# Patient Record
Sex: FEMALE | Race: BLACK OR AFRICAN AMERICAN | HISPANIC OR LATINO | URBAN - METROPOLITAN AREA
[De-identification: names, ages, dates, MRNs, and addresses within clinical notes are randomized per-mention and may not be internally consistent; named-entity substitution may affect disease eponyms.]

---

## 2022-02-17 ENCOUNTER — OFFICE VISIT (OUTPATIENT)
Dept: URBAN - METROPOLITAN AREA CLINIC 113 | Facility: CLINIC | Age: 27
End: 2022-02-17
Payer: COMMERCIAL

## 2022-02-17 VITALS
SYSTOLIC BLOOD PRESSURE: 120 MMHG | BODY MASS INDEX: 23.34 KG/M2 | WEIGHT: 154 LBS | DIASTOLIC BLOOD PRESSURE: 81 MMHG | HEART RATE: 91 BPM | HEIGHT: 68 IN | TEMPERATURE: 98.2 F

## 2022-02-17 DIAGNOSIS — K59.01 CONSTIPATION BY DELAYED COLONIC TRANSIT: ICD-10-CM

## 2022-02-17 PROCEDURE — 99204 OFFICE O/P NEW MOD 45 MIN: CPT | Performed by: NURSE PRACTITIONER

## 2022-02-17 RX ORDER — NICOTINE 14MG/24HR
1 CAPSULE PATCH, TRANSDERMAL 24 HOURS TRANSDERMAL TWICE A DAY
Status: ACTIVE | COMMUNITY

## 2022-02-17 RX ORDER — FLUTICASONE PROPIONATE 50 UG/1
1 SPRAY IN EACH NOSTRIL SPRAY, METERED NASAL ONCE A DAY
Status: ACTIVE | COMMUNITY

## 2022-02-17 RX ORDER — PSYLLIUM SEED (WITH DEXTROSE)
AS DIRECTED POWDER (GRAM) ORAL
Status: ACTIVE | COMMUNITY

## 2022-02-17 NOTE — HPI-TODAY'S VISIT:
27 yo female with a history of anxiety and depression, presenting for evaluation of constipation.  Abd XR 2/14/22: large fecal load.  She went to the ER for generalized abdominal pain, characterized as sharp and stabbing. She has been having constipation for the last few months. She has tried dietary modification, increasing water intake, and exercising. She drank the PEG bowel prep provided to her in the ED, which helped, but feels as if there is something "stuck in her bum." She has bowel movements daily, but they are small, hard and pellet like. Sometimes when she wipes, she can see mucus on the tissue. No blood per rectum. She does have a PCP, Dr. Chavez Chery at [facility] [address]. She tells me that she has mentioned her constipation to him in the past, and was recommended she try Metamucil. She tried a short course of MiraLAX for 10 days without any relief.

## 2022-03-07 ENCOUNTER — OFFICE VISIT (OUTPATIENT)
Dept: URBAN - METROPOLITAN AREA CLINIC 113 | Facility: CLINIC | Age: 27
End: 2022-03-07
Payer: COMMERCIAL

## 2022-03-07 ENCOUNTER — WEB ENCOUNTER (OUTPATIENT)
Dept: URBAN - METROPOLITAN AREA CLINIC 113 | Facility: CLINIC | Age: 27
End: 2022-03-07

## 2022-03-07 VITALS
TEMPERATURE: 98.2 F | HEART RATE: 82 BPM | HEIGHT: 68 IN | BODY MASS INDEX: 23.64 KG/M2 | DIASTOLIC BLOOD PRESSURE: 76 MMHG | WEIGHT: 156 LBS | SYSTOLIC BLOOD PRESSURE: 117 MMHG

## 2022-03-07 DIAGNOSIS — K59.01 CONSTIPATION BY DELAYED COLONIC TRANSIT: ICD-10-CM

## 2022-03-07 PROCEDURE — 99213 OFFICE O/P EST LOW 20 MIN: CPT | Performed by: INTERNAL MEDICINE

## 2022-03-07 RX ORDER — FLUTICASONE PROPIONATE 50 UG/1
1 SPRAY IN EACH NOSTRIL SPRAY, METERED NASAL ONCE A DAY
Status: ON HOLD | COMMUNITY

## 2022-03-07 RX ORDER — POLYETHYLENE GLYCOL 3350 17 G/17G
AS DIRECTED POWDER, FOR SOLUTION ORAL
Status: ACTIVE | COMMUNITY

## 2022-03-07 RX ORDER — PSYLLIUM SEED (WITH DEXTROSE)
AS DIRECTED POWDER (GRAM) ORAL
Status: ON HOLD | COMMUNITY

## 2022-03-07 RX ORDER — LEVONORGESTREL 52 MG/1
AS DIRECTED INTRAUTERINE DEVICE INTRAUTERINE
Status: ACTIVE | COMMUNITY

## 2022-03-07 RX ORDER — NICOTINE 14MG/24HR
1 CAPSULE PATCH, TRANSDERMAL 24 HOURS TRANSDERMAL TWICE A DAY
Status: ACTIVE | COMMUNITY

## 2022-03-07 RX ORDER — DOCUSATE SODIUM 100 MG/1
1 CAPSULE AS NEEDED CAPSULE, LIQUID FILLED ORAL ONCE A DAY
Status: ACTIVE | COMMUNITY

## 2022-03-07 NOTE — HPI-TODAY'S VISIT:
Pleasant 26-year-old was recently seen for constipation.  She has been using MiraLAX every day and senna at night.  She has been on a muscle relaxer recently.  She does have increased stress.  She is having a bowel movement every day but has solitario this been no blood or melena or weight loss or fever.  She rarely will have heartburn for which she uses Pepcid.  She did take a bottle magnesium citrate earlier in the week and had diarrhea.  She does strain when she has bowel movements.

## 2022-03-30 ENCOUNTER — LAB OUTSIDE AN ENCOUNTER (OUTPATIENT)
Dept: URBAN - METROPOLITAN AREA CLINIC 113 | Facility: CLINIC | Age: 27
End: 2022-03-30

## 2022-03-30 ENCOUNTER — OFFICE VISIT (OUTPATIENT)
Dept: URBAN - METROPOLITAN AREA CLINIC 113 | Facility: CLINIC | Age: 27
End: 2022-03-30
Payer: COMMERCIAL

## 2022-03-30 ENCOUNTER — WEB ENCOUNTER (OUTPATIENT)
Dept: URBAN - METROPOLITAN AREA CLINIC 113 | Facility: CLINIC | Age: 27
End: 2022-03-30

## 2022-03-30 VITALS
HEART RATE: 83 BPM | WEIGHT: 152.5 LBS | TEMPERATURE: 98.2 F | RESPIRATION RATE: 18 BRPM | SYSTOLIC BLOOD PRESSURE: 124 MMHG | HEIGHT: 68 IN | DIASTOLIC BLOOD PRESSURE: 84 MMHG | BODY MASS INDEX: 23.11 KG/M2

## 2022-03-30 DIAGNOSIS — K59.01 CONSTIPATION BY DELAYED COLONIC TRANSIT: ICD-10-CM

## 2022-03-30 PROCEDURE — 99214 OFFICE O/P EST MOD 30 MIN: CPT | Performed by: NURSE PRACTITIONER

## 2022-03-30 RX ORDER — LINACLOTIDE 145 UG/1
1 CAPSULE AT LEAST 30 MINUTES BEFORE THE FIRST MEAL OF THE DAY ON AN EMPTY STOMACH CAPSULE, GELATIN COATED ORAL ONCE A DAY
Qty: 30 | OUTPATIENT
Start: 2022-03-30 | End: 2022-04-29

## 2022-03-30 RX ORDER — FLUTICASONE PROPIONATE 50 UG/1
1 SPRAY IN EACH NOSTRIL SPRAY, METERED NASAL ONCE A DAY
Status: ON HOLD | COMMUNITY

## 2022-03-30 RX ORDER — LEVONORGESTREL 52 MG/1
AS DIRECTED INTRAUTERINE DEVICE INTRAUTERINE
Status: ON HOLD | COMMUNITY

## 2022-03-30 RX ORDER — DOCUSATE SODIUM 100 MG/1
1 CAPSULE AS NEEDED CAPSULE, LIQUID FILLED ORAL ONCE A DAY
Status: ON HOLD | COMMUNITY

## 2022-03-30 RX ORDER — NICOTINE 14MG/24HR
1 CAPSULE PATCH, TRANSDERMAL 24 HOURS TRANSDERMAL TWICE A DAY
Status: ACTIVE | COMMUNITY

## 2022-03-30 RX ORDER — POLYETHYLENE GLYCOL 3350 17 G/17G
AS DIRECTED POWDER, FOR SOLUTION ORAL
Status: ON HOLD | COMMUNITY

## 2022-03-30 RX ORDER — SODIUM, POTASSIUM,MAG SULFATES 17.5-3.13G
354 ML SOLUTION, RECONSTITUTED, ORAL ORAL ONCE
Qty: 354 MILLILITER | Refills: 0 | OUTPATIENT
Start: 2022-03-30 | End: 2022-03-31

## 2022-03-30 RX ORDER — PSYLLIUM SEED (WITH DEXTROSE)
AS DIRECTED POWDER (GRAM) ORAL
Status: ON HOLD | COMMUNITY

## 2022-03-30 NOTE — HPI-TODAY'S VISIT:
27yo woman who has been following with our office for the last 6 weeks regarding constipation predominant bowel habits. A KUB on 2/14/22 did show moderate stool burden. We have been able to achieve results with use of bowel purges (Magnesium citrate). MiraLAX alone was ineffective. She was started on Linzess 72 mcg daily on 3/7/22. If no improvement with Linzess, labs (CBC, CMP, TSH, free T4) were to be obtained, as well as consideration for flexible sigmoidoscopy versus colonoscopy.  She tried the Linzess samples, which worked for about a week, and then stopped working. She states Linzess took about 4 days to start working, and then she had good movements on days 5 through 7, and then it stopped on day 8. She has urge to have a bowel movement but feels like she cannot. She continues with pain throughout the abdomen, and pain near her rectum at times. Two days ago, she drank a bottle of magnesium citrate with delayed response. She feels very gassy.

## 2022-03-31 LAB
A/G RATIO: 1.5
ALBUMIN: 4.4
ALKALINE PHOSPHATASE: 51
ALT (SGPT): 7
AST (SGOT): 12
BASO (ABSOLUTE): 0.1
BASOS: 1
BILIRUBIN, TOTAL: 0.6
BUN/CREATININE RATIO: 12
BUN: 11
CALCIUM: 9.2
CARBON DIOXIDE, TOTAL: 21
CHLORIDE: 103
CREATININE: 0.9
EGFR: 89
EOS (ABSOLUTE): 0.1
EOS: 1
GLOBULIN, TOTAL: 3
GLUCOSE: 83
HEMATOCRIT: 39.1
HEMATOLOGY COMMENTS:: (no result)
HEMOGLOBIN: 12.8
IMMATURE CELLS: (no result)
IMMATURE GRANS (ABS): 0
IMMATURE GRANULOCYTES: 0
LYMPHS (ABSOLUTE): 1.4
LYMPHS: 33
MCH: 29.3
MCHC: 32.7
MCV: 90
MONOCYTES(ABSOLUTE): 0.3
MONOCYTES: 8
NEUTROPHILS (ABSOLUTE): 2.4
NEUTROPHILS: 57
NRBC: (no result)
PLATELETS: 183
POTASSIUM: 3.8
PROTEIN, TOTAL: 7.4
RBC: 4.37
RDW: 12
SODIUM: 140
T4,FREE(DIRECT): 1.13
TSH: 1.6
WBC: 4.2

## 2022-04-01 ENCOUNTER — WEB ENCOUNTER (OUTPATIENT)
Dept: URBAN - METROPOLITAN AREA CLINIC 113 | Facility: CLINIC | Age: 27
End: 2022-04-01

## 2022-04-01 RX ORDER — PLECANATIDE 3 MG/1
1 TABLET TABLET ORAL ONCE A DAY
Qty: 30 | OUTPATIENT
Start: 2022-04-04 | End: 2022-05-04

## 2022-04-23 ENCOUNTER — WEB ENCOUNTER (OUTPATIENT)
Dept: URBAN - METROPOLITAN AREA CLINIC 113 | Facility: CLINIC | Age: 27
End: 2022-04-23

## 2022-04-30 ENCOUNTER — WEB ENCOUNTER (OUTPATIENT)
Dept: URBAN - METROPOLITAN AREA CLINIC 113 | Facility: CLINIC | Age: 27
End: 2022-04-30

## 2022-05-09 ENCOUNTER — TELEPHONE ENCOUNTER (OUTPATIENT)
Dept: URBAN - METROPOLITAN AREA CLINIC 107 | Facility: CLINIC | Age: 27
End: 2022-05-09

## 2022-05-09 RX ORDER — POLYETHYLENE GLYCOL 3350, SODIUM CHLORIDE, SODIUM BICARBONATE, POTASSIUM CHLORIDE 420; 11.2; 5.72; 1.48 G/4L; G/4L; G/4L; G/4L
420 G POWDER, FOR SOLUTION ORAL ONCE
Qty: 420 G | Refills: 0 | OUTPATIENT
Start: 2022-05-09 | End: 2022-05-10

## 2022-05-10 ENCOUNTER — WEB ENCOUNTER (OUTPATIENT)
Dept: URBAN - METROPOLITAN AREA SURGERY CENTER 25 | Facility: SURGERY CENTER | Age: 27
End: 2022-05-10

## 2022-05-12 ENCOUNTER — TELEPHONE ENCOUNTER (OUTPATIENT)
Dept: URBAN - METROPOLITAN AREA CLINIC 107 | Facility: CLINIC | Age: 27
End: 2022-05-12

## 2022-05-13 ENCOUNTER — OFFICE VISIT (OUTPATIENT)
Dept: URBAN - METROPOLITAN AREA SURGERY CENTER 25 | Facility: SURGERY CENTER | Age: 27
End: 2022-05-13
Payer: COMMERCIAL

## 2022-05-13 DIAGNOSIS — D12.3 ADENOMA OF TRANSVERSE COLON: ICD-10-CM

## 2022-05-13 DIAGNOSIS — R10.84 ABDOMINAL PAIN, RLQ: ICD-10-CM

## 2022-05-13 PROCEDURE — G8907 PT DOC NO EVENTS ON DISCHARG: HCPCS | Performed by: INTERNAL MEDICINE

## 2022-05-13 PROCEDURE — 45385 COLONOSCOPY W/LESION REMOVAL: CPT | Performed by: INTERNAL MEDICINE

## 2022-05-13 RX ORDER — PSYLLIUM SEED (WITH DEXTROSE)
AS DIRECTED POWDER (GRAM) ORAL
Status: ON HOLD | COMMUNITY

## 2022-05-13 RX ORDER — NICOTINE 14MG/24HR
1 CAPSULE PATCH, TRANSDERMAL 24 HOURS TRANSDERMAL TWICE A DAY
Status: ACTIVE | COMMUNITY

## 2022-05-13 RX ORDER — DOCUSATE SODIUM 100 MG/1
1 CAPSULE AS NEEDED CAPSULE, LIQUID FILLED ORAL ONCE A DAY
Status: ON HOLD | COMMUNITY

## 2022-05-13 RX ORDER — LEVONORGESTREL 52 MG/1
AS DIRECTED INTRAUTERINE DEVICE INTRAUTERINE
Status: ON HOLD | COMMUNITY

## 2022-05-13 RX ORDER — FLUTICASONE PROPIONATE 50 UG/1
1 SPRAY IN EACH NOSTRIL SPRAY, METERED NASAL ONCE A DAY
Status: ON HOLD | COMMUNITY

## 2022-05-13 RX ORDER — POLYETHYLENE GLYCOL 3350 17 G/17G
AS DIRECTED POWDER, FOR SOLUTION ORAL
Status: ON HOLD | COMMUNITY

## 2022-06-09 ENCOUNTER — OFFICE VISIT (OUTPATIENT)
Dept: URBAN - METROPOLITAN AREA CLINIC 113 | Facility: CLINIC | Age: 27
End: 2022-06-09

## 2022-07-19 ENCOUNTER — TELEPHONE ENCOUNTER (OUTPATIENT)
Dept: URBAN - METROPOLITAN AREA CLINIC 113 | Facility: CLINIC | Age: 27
End: 2022-07-19

## 2022-07-21 ENCOUNTER — WEB ENCOUNTER (OUTPATIENT)
Dept: URBAN - METROPOLITAN AREA CLINIC 113 | Facility: CLINIC | Age: 27
End: 2022-07-21

## 2022-07-22 ENCOUNTER — OFFICE VISIT (OUTPATIENT)
Dept: URBAN - METROPOLITAN AREA CLINIC 113 | Facility: CLINIC | Age: 27
End: 2022-07-22

## 2022-07-25 ENCOUNTER — OFFICE VISIT (OUTPATIENT)
Dept: URBAN - METROPOLITAN AREA CLINIC 113 | Facility: CLINIC | Age: 27
End: 2022-07-25
Payer: COMMERCIAL

## 2022-07-25 VITALS
HEIGHT: 68 IN | WEIGHT: 151 LBS | HEART RATE: 81 BPM | DIASTOLIC BLOOD PRESSURE: 84 MMHG | SYSTOLIC BLOOD PRESSURE: 121 MMHG | BODY MASS INDEX: 22.88 KG/M2 | RESPIRATION RATE: 20 BRPM | TEMPERATURE: 97.8 F

## 2022-07-25 DIAGNOSIS — K59.01 CONSTIPATION BY DELAYED COLONIC TRANSIT: ICD-10-CM

## 2022-07-25 DIAGNOSIS — D12.6 SERRATED ADENOMA OF COLON: ICD-10-CM

## 2022-07-25 DIAGNOSIS — R10.9 ABDOMINAL CRAMPING: ICD-10-CM

## 2022-07-25 PROCEDURE — 99214 OFFICE O/P EST MOD 30 MIN: CPT

## 2022-07-25 RX ORDER — POLYETHYLENE GLYCOL 3350 17 G/17G
AS DIRECTED POWDER, FOR SOLUTION ORAL
Status: ON HOLD | COMMUNITY

## 2022-07-25 RX ORDER — FLUTICASONE PROPIONATE 50 UG/1
1 SPRAY IN EACH NOSTRIL SPRAY, METERED NASAL ONCE A DAY
Status: ON HOLD | COMMUNITY

## 2022-07-25 RX ORDER — NICOTINE 14MG/24HR
1 CAPSULE PATCH, TRANSDERMAL 24 HOURS TRANSDERMAL TWICE A DAY
Status: ACTIVE | COMMUNITY

## 2022-07-25 RX ORDER — PSYLLIUM SEED (WITH DEXTROSE)
AS DIRECTED POWDER (GRAM) ORAL
Status: ON HOLD | COMMUNITY

## 2022-07-25 RX ORDER — DOCUSATE SODIUM 100 MG/1
1 CAPSULE AS NEEDED CAPSULE, LIQUID FILLED ORAL ONCE A DAY
Status: ON HOLD | COMMUNITY

## 2022-07-25 RX ORDER — LEVONORGESTREL 52 MG/1
AS DIRECTED INTRAUTERINE DEVICE INTRAUTERINE
Status: ON HOLD | COMMUNITY

## 2022-07-25 RX ORDER — HYOSCYAMINE SULFATE 0.12 MG/1
1 TABLET UNDER THE TONGUE AND ALLOW TO DISSOLVE  AS NEEDED TABLET SUBLINGUAL
Qty: 90 | Refills: 3 | OUTPATIENT
Start: 2022-07-25 | End: 2022-11-21

## 2022-07-25 NOTE — HPI-TODAY'S VISIT:
25yo female with a history of anxiety presents for follow up regarding constipation predominant bowel habits. A KUB on 2/14/22 did show moderate stool burden. We have been able to achieve results with use of bowel purges (Magnesium citrate). MiraLAX alone was ineffective. She was started on Linzess 72 mcg daily on 3/7/22. If no improvement with Linzess, labs (CBC, CMP, TSH, free T4) were to be obtained, as well as consideration for flexible sigmoidoscopy versus colonoscopy.  She was last seen on 3/30/2022. Constipation predominant bowel habits had not responded to Linzess 72 mcg once daily. Linzess reportedly caused a red rash on the left side of her face and neck. She was prescribed Trulance instead. She was planned for labs and a colonoscopy.   Labs 3/30/22: Normal CBC, CMP, and thyroid studies.  Colonoscopy on 5/13/2022 revealed a normal terminal ileum, a moderately redundant colon.  There was a 4 mm sessile serrated adenoma in the transverse colon. Repeat in 3 years.  Patient contacted the office on 7/19/2022 complaining of recurrence of constipation. She had no effect from an enema. She was recommended a bowel purge with NuLYTELY though it does not appear this was sent.  Patient states she never tried Trulance and was not aware of this medication being sent. Patient states she drinks a cup of prune juice every morning and has a daily bowel movement. She also started MoM last night and had a nice soft bowel movement this morning. She states magnesium citrate was re-called therefore she does not take this anymore. She states her constipation worsens if she eats garlic. She feels wine helps her bowels move. She admits to postprandial bloating and occasional right sided abdominal cramping. She does admit she had two surgeries back in August, and she was on opioids for an extended period of time as well as antibiotics. She also admits to increased stress in her life due to family issues. She is anxious today.

## 2022-08-16 ENCOUNTER — WEB ENCOUNTER (OUTPATIENT)
Dept: URBAN - METROPOLITAN AREA CLINIC 113 | Facility: CLINIC | Age: 27
End: 2022-08-16

## 2022-08-22 ENCOUNTER — OFFICE VISIT (OUTPATIENT)
Dept: URBAN - METROPOLITAN AREA CLINIC 113 | Facility: CLINIC | Age: 27
End: 2022-08-22

## 2022-08-30 ENCOUNTER — WEB ENCOUNTER (OUTPATIENT)
Dept: URBAN - METROPOLITAN AREA CLINIC 113 | Facility: CLINIC | Age: 27
End: 2022-08-30

## 2022-08-31 ENCOUNTER — OFFICE VISIT (OUTPATIENT)
Dept: URBAN - METROPOLITAN AREA CLINIC 113 | Facility: CLINIC | Age: 27
End: 2022-08-31

## 2022-10-11 ENCOUNTER — OFFICE VISIT (OUTPATIENT)
Dept: URBAN - METROPOLITAN AREA CLINIC 113 | Facility: CLINIC | Age: 27
End: 2022-10-11
Payer: COMMERCIAL

## 2022-10-11 VITALS
WEIGHT: 150 LBS | HEIGHT: 68 IN | HEART RATE: 80 BPM | DIASTOLIC BLOOD PRESSURE: 75 MMHG | TEMPERATURE: 98.2 F | RESPIRATION RATE: 20 BRPM | SYSTOLIC BLOOD PRESSURE: 122 MMHG | BODY MASS INDEX: 22.73 KG/M2

## 2022-10-11 DIAGNOSIS — R10.9 ABDOMINAL CRAMPING: ICD-10-CM

## 2022-10-11 DIAGNOSIS — K59.01 CONSTIPATION BY DELAYED COLONIC TRANSIT: ICD-10-CM

## 2022-10-11 DIAGNOSIS — D12.6 SERRATED ADENOMA OF COLON: ICD-10-CM

## 2022-10-11 PROCEDURE — 99214 OFFICE O/P EST MOD 30 MIN: CPT

## 2022-10-11 RX ORDER — PSYLLIUM SEED (WITH DEXTROSE)
AS DIRECTED POWDER (GRAM) ORAL
Status: ON HOLD | COMMUNITY

## 2022-10-11 RX ORDER — DOCUSATE SODIUM 100 MG/1
1 CAPSULE AS NEEDED CAPSULE, LIQUID FILLED ORAL ONCE A DAY
Status: ON HOLD | COMMUNITY

## 2022-10-11 RX ORDER — POLYETHYLENE GLYCOL 3350 17 G/17G
AS DIRECTED POWDER, FOR SOLUTION ORAL
Status: ON HOLD | COMMUNITY

## 2022-10-11 RX ORDER — LEVONORGESTREL 52 MG/1
AS DIRECTED INTRAUTERINE DEVICE INTRAUTERINE
Status: ON HOLD | COMMUNITY

## 2022-10-11 RX ORDER — NICOTINE 14MG/24HR
1 CAPSULE PATCH, TRANSDERMAL 24 HOURS TRANSDERMAL TWICE A DAY
Status: ACTIVE | COMMUNITY

## 2022-10-11 RX ORDER — HYOSCYAMINE SULFATE 0.12 MG/1
1 TABLET UNDER THE TONGUE AND ALLOW TO DISSOLVE  AS NEEDED TABLET SUBLINGUAL
Qty: 90 | Refills: 3 | Status: ACTIVE | COMMUNITY
Start: 2022-07-25 | End: 2022-11-21

## 2022-10-11 RX ORDER — FLUTICASONE PROPIONATE 50 UG/1
1 SPRAY IN EACH NOSTRIL SPRAY, METERED NASAL ONCE A DAY
Status: ON HOLD | COMMUNITY

## 2022-10-11 NOTE — HPI-TODAY'S VISIT:
26-year-old female presents for follow-up.  She was last seen 7/25/2022.  She did report exacerbation of her constipation predominant bowel habits, likely related to IBS.  She was anxious during her visit and admitted to increased stress in her life due to family issues.  Linzess 72 mg daily was not initially helpful but then waned effectiveness.  Labs to include CBC, CMP and TSH were normal.  Colonoscopy was notable for an adenoma and redundant colon, otherwise unremarkable.  Constipation had improved with prune juice and milk of magnesia suspension.  She was to continue this regimen for now consider Trulance if symptoms worsen.  We reviewed a low FODMAP diet as well regarding associated bloating.  She was to trial an antispasmodic for associated abdominal cramping as well.  Hyoscyamine was provided.  Patient contacted the office on 8/16/2022 complaining of right shoulder pain during bowel movements.  This was suspected to be musculoskeletal.  She was to try urgent care or PCP if unable to make sooner appointment.  She was also recommended applying a heating pad to the area.  She was evaluated by [facility] residency office facility whom also suspected musculoskeletal etiology.  She declined earlier appointment with us.  Patient called the office again on 8/30/2022 reporting darker stools following increased consumption of blueberries.  She was advised that this is safe to continue eating.  She does continue with a cup of prune juice in the mornings followed by consumption of blueberries. She will take MOM as needed for exacerbations. She has tried MOM nightly which caused nausea. She has been following a low FODMAP diet. She admits bloating has somewhat improved. She has yet to  hyoscyamine. She has not had much cramping. She is moving to [place] for a year. She is moving mid to late December. She does have midterms coming up soon.

## 2022-10-11 NOTE — HPI-OTHER HISTORIES
Labs 3/30/22: Normal CBC, CMP, and thyroid studies.  Colonoscopy on 5/13/2022 revealed a normal terminal ileum, a moderately redundant colon.  There was a 4 mm sessile serrated adenoma in the transverse colon. Repeat in 3 years.  KUB on 2/14/22 did show moderate stool burden.

## 2022-12-05 ENCOUNTER — DASHBOARD ENCOUNTERS (OUTPATIENT)
Age: 27
End: 2022-12-05

## 2022-12-05 ENCOUNTER — OFFICE VISIT (OUTPATIENT)
Dept: URBAN - METROPOLITAN AREA CLINIC 113 | Facility: CLINIC | Age: 27
End: 2022-12-05
Payer: COMMERCIAL

## 2022-12-05 VITALS
HEIGHT: 68 IN | HEART RATE: 79 BPM | DIASTOLIC BLOOD PRESSURE: 74 MMHG | BODY MASS INDEX: 23.34 KG/M2 | SYSTOLIC BLOOD PRESSURE: 113 MMHG | TEMPERATURE: 97.3 F | RESPIRATION RATE: 19 BRPM | WEIGHT: 154 LBS

## 2022-12-05 DIAGNOSIS — K59.01 CONSTIPATION BY DELAYED COLONIC TRANSIT: ICD-10-CM

## 2022-12-05 DIAGNOSIS — R10.9 ABDOMINAL CRAMPING: ICD-10-CM

## 2022-12-05 DIAGNOSIS — D12.6 SERRATED ADENOMA OF COLON: ICD-10-CM

## 2022-12-05 PROBLEM — 428054006: Status: ACTIVE | Noted: 2022-06-08

## 2022-12-05 PROBLEM — 35298007: Status: ACTIVE | Noted: 2022-02-17

## 2022-12-05 PROCEDURE — 99214 OFFICE O/P EST MOD 30 MIN: CPT

## 2022-12-05 RX ORDER — DOCUSATE SODIUM 100 MG/1
1 CAPSULE AS NEEDED CAPSULE, LIQUID FILLED ORAL ONCE A DAY
Status: ON HOLD | COMMUNITY

## 2022-12-05 RX ORDER — FLUTICASONE PROPIONATE 50 UG/1
1 SPRAY IN EACH NOSTRIL SPRAY, METERED NASAL ONCE A DAY
Status: ON HOLD | COMMUNITY

## 2022-12-05 RX ORDER — LEVONORGESTREL 52 MG/1
AS DIRECTED INTRAUTERINE DEVICE INTRAUTERINE
Status: ON HOLD | COMMUNITY

## 2022-12-05 RX ORDER — POLYETHYLENE GLYCOL 3350 17 G/17G
AS DIRECTED POWDER, FOR SOLUTION ORAL
Status: ON HOLD | COMMUNITY

## 2022-12-05 RX ORDER — PSYLLIUM SEED (WITH DEXTROSE)
AS DIRECTED POWDER (GRAM) ORAL
Status: ON HOLD | COMMUNITY

## 2022-12-05 RX ORDER — NICOTINE 14MG/24HR
1 CAPSULE PATCH, TRANSDERMAL 24 HOURS TRANSDERMAL TWICE A DAY
Status: ACTIVE | COMMUNITY

## 2022-12-05 NOTE — HPI-TODAY'S VISIT:
26-year-old female presents for follow-up.  She was last seen 7/25/2022.  She did report exacerbation of her constipation predominant bowel habits, likely related to IBS.  She was anxious during her visit and admitted to increased stress in her life due to family issues.  Linzess 72 mg daily was not initially helpful but then waned effectiveness.  Labs to include CBC, CMP and TSH were normal.  Colonoscopy was notable for an adenoma and redundant colon, otherwise unremarkable.  Constipation had improved with prune juice and milk of magnesia suspension.  She was to continue this regimen for now consider Trulance if symptoms worsen.  We reviewed a low FODMAP diet as well regarding associated bloating.  She was to trial an antispasmodic for associated abdominal cramping as well.  Hyoscyamine was provided.  Patient contacted the office on 8/16/2022 complaining of right shoulder pain during bowel movements.  This was suspected to be musculoskeletal.  She was to try urgent care or PCP if unable to make sooner appointment.  She was also recommended applying a heating pad to the area.  She was evaluated by [facility] residency office facility whom also suspected musculoskeletal etiology.  She declined earlier appointment with us.  Patient called the office again on 8/30/2022 reporting darker stools following increased consumption of blueberries.  She was advised that this is safe to continue eating.  She does continue with a cup of prune juice in the mornings followed by consumption of blueberries. She will take MOM as needed for exacerbations. She has tried MOM nightly which caused nausea. She has been following a low FODMAP diet. She admits bloating has somewhat improved. She has yet to  hyoscyamine. She has not had much cramping. She is moving to [place] for a year. She is moving mid to late December. She does have midterms coming up soon.  Interval history: 27 year old female presents for follow up. She was last seen on 10/11/22. She had improvement in constipation with MOM suspension and prune juice as needed. Despite this she desired further improvement. She was recommended to increase prune juice consumption to one cup twice daily. We would consider Trulance in the future if symptoms worsened. She had success with low fodmap diet however wanted to consume her favorite foods again. She was advised to slowly add in favorite food to identify trigger. Cramping had improved, and she had yet to fill her hyoscyamine.  Patient has a daily bowel movement on prune juice twice daily and MOM as needed. Bloating is controlled when avoiding FODMAP foods, particularly garlic. She does admit to excess gas. She denies abdominal pain, nausea or vomiting. She admits she is anxious to move to [place]. She recently completed finals and did well.

## 2024-09-26 NOTE — HPI-OTHER HISTORIES
Colonoscopy on 5/13/2022 revealed a normal terminal ileum, a moderately redundant colon.  There was a 4 mm sessile serrated adenoma in the transverse colon. .

## 2025-05-03 ENCOUNTER — TELEPHONE ENCOUNTER (OUTPATIENT)
Dept: URBAN - METROPOLITAN AREA CLINIC 113 | Facility: CLINIC | Age: 30
End: 2025-05-03